# Patient Record
Sex: MALE | Race: WHITE | NOT HISPANIC OR LATINO | Employment: PART TIME | URBAN - METROPOLITAN AREA
[De-identification: names, ages, dates, MRNs, and addresses within clinical notes are randomized per-mention and may not be internally consistent; named-entity substitution may affect disease eponyms.]

---
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
## 2019-01-14 ENCOUNTER — HOSPITAL ENCOUNTER (EMERGENCY)
Facility: HOSPITAL | Age: 19
Discharge: HOME | End: 2019-01-14
Attending: EMERGENCY MEDICINE | Admitting: EMERGENCY MEDICINE

## 2019-01-14 VITALS
OXYGEN SATURATION: 95 % | TEMPERATURE: 99.3 F | HEART RATE: 111 BPM | RESPIRATION RATE: 18 BRPM | HEIGHT: 71 IN | BODY MASS INDEX: 37.8 KG/M2 | DIASTOLIC BLOOD PRESSURE: 82 MMHG | SYSTOLIC BLOOD PRESSURE: 132 MMHG | WEIGHT: 270 LBS

## 2019-01-14 DIAGNOSIS — J10.1 INFLUENZA A: Primary | ICD-10-CM

## 2019-01-14 DIAGNOSIS — R52 GENERALIZED BODY ACHES: ICD-10-CM

## 2019-01-14 DIAGNOSIS — R09.81 NASAL CONGESTION: ICD-10-CM

## 2019-01-14 DIAGNOSIS — Z86.59 HISTORY OF DEPRESSION: ICD-10-CM

## 2019-01-14 LAB
FLUAV AG NPH QL: POSITIVE
FLUBV AG NPH QL IA: NEGATIVE
HOLD SPECIMEN: NORMAL
HOLD SPECIMEN: NORMAL
WHOLE BLOOD HOLD SPECIMEN: NORMAL
WHOLE BLOOD HOLD SPECIMEN: NORMAL

## 2019-01-14 PROCEDURE — 87804 INFLUENZA ASSAY W/OPTIC: CPT

## 2019-01-14 PROCEDURE — 99284 EMERGENCY DEPT VISIT MOD MDM: CPT

## 2019-01-14 PROCEDURE — 36415 COLL VENOUS BLD VENIPUNCTURE: CPT

## 2019-01-14 RX ORDER — OSELTAMIVIR PHOSPHATE 75 MG/1
75 CAPSULE ORAL ONCE
Status: COMPLETED | OUTPATIENT
Start: 2019-01-14 | End: 2019-01-14

## 2019-01-14 RX ORDER — OSELTAMIVIR PHOSPHATE 75 MG/1
75 CAPSULE ORAL EVERY 12 HOURS
Qty: 9 CAPSULE | Refills: 0 | Status: SHIPPED | OUTPATIENT
Start: 2019-01-14

## 2019-01-14 RX ORDER — SODIUM CHLORIDE 0.9 % (FLUSH) 0.9 %
10 SYRINGE (ML) INJECTION AS NEEDED
Status: DISCONTINUED | OUTPATIENT
Start: 2019-01-14 | End: 2019-01-14 | Stop reason: HOSPADM

## 2019-01-14 RX ADMIN — OSELTAMIVIR PHOSPHATE 75 MG: 75 CAPSULE ORAL at 12:46

## 2019-01-14 NOTE — DISCHARGE INSTRUCTIONS
Patient tested positive for influenza A.  Exam is stable and vitals are stable.  Rx for Tamiflu 75 mg by mouth twice daily for 5 days.  Recommend over-the-counter cold and flu preparations to help with symptomatic relief.  Tylenol/ibuprofen every 4-6 hours as needed for fever/body aches.  Recommend close PCP follow-up for recheck.  Return if any worsening symptoms.

## 2019-01-14 NOTE — ED PROVIDER NOTES
Subjective   Anastasia Brasher is a 18 y.o.male who presents to the ED with complaints of flu like symptoms. The patient reports sudden onset of headache, nausea, congestion, coughing, myalgias, subjective fever, chills, diaphoresis, fatigue, muscular neck pain, and abdominal cramping yesterday morning. He has taken ibuprofen for his subjective fever and body aches. He denies any shortness of breath or wheezing. He denies any hx of asthma and does not take any daily medications. He does not have a family doctor. He took some Ibuprofen yesterday for the body aches and fever.  There are no other acute complaints at this time.        History provided by:  Patient  Flu Symptoms   Presenting symptoms: cough, fatigue, fever, headache, myalgias and nausea    Presenting symptoms: no shortness of breath and no sore throat    Severity:  Moderate  Onset quality:  Sudden  Duration:  1 day  Progression:  Worsening  Chronicity:  New  Relieved by:  OTC medications  Associated symptoms: chills and nasal congestion    Associated symptoms: no ear pain    Risk factors: not elderly and no sick contacts        Review of Systems   Constitutional: Positive for appetite change (anorexia), chills, diaphoresis, fatigue and fever.   HENT: Positive for congestion. Negative for ear pain and sore throat.    Respiratory: Positive for cough. Negative for shortness of breath and wheezing.         Non-smoker     Gastrointestinal: Positive for abdominal pain (generalized cramping) and nausea.   Genitourinary: Negative.    Musculoskeletal: Positive for myalgias and neck pain.   Neurological: Positive for headaches.   All other systems reviewed and are negative.      Past Medical History:   Diagnosis Date   • Depression        No Known Allergies    Past Surgical History:   Procedure Laterality Date   • TONSILLECTOMY         Family History   Problem Relation Age of Onset   • Hypertension Mother    • Sleep apnea Mother    • Alzheimer's disease Maternal  Grandmother    • Dementia Maternal Grandmother        Social History     Socioeconomic History   • Marital status: Single     Spouse name: Not on file   • Number of children: Not on file   • Years of education: Not on file   • Highest education level: Not on file   Tobacco Use   • Smoking status: Never Smoker   • Smokeless tobacco: Never Used   Substance and Sexual Activity   • Alcohol use: No   • Drug use: No   • Sexual activity: Defer   Social History Narrative    Patient's mother is in room 31. He lives at home with his mother. His grandmother is at bedside.          Objective   Physical Exam   Constitutional: He is oriented to person, place, and time. He appears well-developed and well-nourished. No distress.   HENT:   Head: Normocephalic and atraumatic.   He has positive nasal congestion. He has no frontal or maxillary tenderness. Oropharynx is clear.    Eyes: Conjunctivae are normal. No scleral icterus.   Neck: Normal range of motion. Neck supple.   No meningeal signs.    Cardiovascular: Regular rhythm and normal heart sounds. Tachycardia present.   No murmur heard.  Mild tachycardia.    Pulmonary/Chest: Effort normal and breath sounds normal. No respiratory distress.   Abdominal: Soft. Bowel sounds are normal. There is no tenderness.   Musculoskeletal: Normal range of motion. He exhibits no edema.   Lymphadenopathy:     He has no cervical adenopathy.   Neurological: He is alert and oriented to person, place, and time.   Skin: Skin is warm and dry.   Psychiatric: He has a normal mood and affect. His behavior is normal.   Nursing note and vitals reviewed.      Procedures         ED Course     Recent Results (from the past 24 hour(s))   Influenza Antigen, Rapid - Swab, Nasopharynx    Collection Time: 01/14/19 11:17 AM   Result Value Ref Range    Influenza A Ag, EIA Positive (A) Negative    Influenza B Ag, EIA Negative Negative   Light Blue Top    Collection Time: 01/14/19 11:18 AM   Result Value Ref Range     "Extra Tube hold for add-on    Green Top (Gel)    Collection Time: 01/14/19 11:18 AM   Result Value Ref Range    Extra Tube Hold for add-ons.    Lavender Top    Collection Time: 01/14/19 11:18 AM   Result Value Ref Range    Extra Tube hold for add-on    Gold Top - SST    Collection Time: 01/14/19 11:18 AM   Result Value Ref Range    Extra Tube Hold for add-ons.      Note: In addition to lab results from this visit, the labs listed above may include labs taken at another facility or during a different encounter within the last 24 hours. Please correlate lab times with ED admission and discharge times for further clarification of the services performed during this visit.    No orders to display     Vitals:    01/14/19 1056   BP: 132/82   BP Location: Left arm   Patient Position: Sitting   Pulse: 111   Resp: 18   Temp: 99.3 °F (37.4 °C)   TempSrc: Oral   SpO2: 95%   Weight: 122 kg (270 lb)   Height: 180.3 cm (71\")     Medications   sodium chloride 0.9 % flush 10 mL (not administered)   oseltamivir (TAMIFLU) capsule 75 mg (75 mg Oral Given 1/14/19 1246)     ECG/EMG Results (last 24 hours)     ** No results found for the last 24 hours. **                        MDM    Final diagnoses:   Influenza A   Nasal congestion   Generalized body aches   History of depression       Documentation assistance provided by patricia Tyler.  Information recorded by the [email or web address] was done at my direction and has been verified and validated by me.     Hayder Tyler  01/14/19 1241       Hayder Tyler  01/14/19 1244       Jocelyn Armas PA-C  01/14/19 1249    "